# Patient Record
Sex: MALE | Race: WHITE | NOT HISPANIC OR LATINO | Employment: OTHER | URBAN - METROPOLITAN AREA
[De-identification: names, ages, dates, MRNs, and addresses within clinical notes are randomized per-mention and may not be internally consistent; named-entity substitution may affect disease eponyms.]

---

## 2021-06-15 ENCOUNTER — TELEPHONE (OUTPATIENT)
Dept: NEUROLOGY | Facility: CLINIC | Age: 76
End: 2021-06-15

## 2021-06-15 NOTE — TELEPHONE ENCOUNTER
Best contact number for patient:[phone]    Emergency Contact name and number:None    Referring provider and telephone number:Self     Primary Care Provider Name and if affiliated with [facility]  [place]: Dr Garret Torres [phone]    Reason for Appointment/Dx:Seizure     Have you seen and followed up with a pediatric Neurologist for this disease in the past?  No      Neurology Location patient would like to be seen:[place]     Order received? Self Ref                                                 Records Received? No    Have you ever seen another Neurologist?       No     Insurance Information    Insurance Name:[employer] [facility] [place]     ID/Policy #:NVY [id number]      Secondary Insurance:Medicare     ID/Policy#: [id number]      Workman's Comp/ Accident/ School  Information      Workman's Comp/Accident/School related?        None    If yes name of Insurance company:    Claim #:    Date of Injury:    Type of Injury:     Name and Telephone Number:    Notes:Appointment schedule with patient & wife new patient pack sent confirmed insurance   Patient new to [facility] will have pcp fax over records                    Appointment date: 09-02-21 1:30pm with Dr Laura Tadeo

## 2021-08-27 ENCOUNTER — TELEPHONE (OUTPATIENT)
Dept: NEUROLOGY | Facility: CLINIC | Age: 76
End: 2021-08-27

## 2021-11-18 ENCOUNTER — APPOINTMENT (OUTPATIENT)
Dept: RADIOLOGY | Facility: OTHER | Age: 76
End: 2021-11-18
Payer: COMMERCIAL

## 2021-11-18 ENCOUNTER — OFFICE VISIT (OUTPATIENT)
Dept: OBGYN CLINIC | Facility: OTHER | Age: 76
End: 2021-11-18
Payer: COMMERCIAL

## 2021-11-18 VITALS
SYSTOLIC BLOOD PRESSURE: 150 MMHG | HEIGHT: 71 IN | BODY MASS INDEX: 28.39 KG/M2 | WEIGHT: 202.8 LBS | DIASTOLIC BLOOD PRESSURE: 85 MMHG | HEART RATE: 63 BPM

## 2021-11-18 DIAGNOSIS — M75.81 TENDINITIS OF RIGHT ROTATOR CUFF: ICD-10-CM

## 2021-11-18 DIAGNOSIS — M25.511 RIGHT SHOULDER PAIN, UNSPECIFIED CHRONICITY: ICD-10-CM

## 2021-11-18 DIAGNOSIS — M25.511 ACUTE PAIN OF RIGHT SHOULDER: Primary | ICD-10-CM

## 2021-11-18 DIAGNOSIS — M75.51 SUBACROMIAL BURSITIS OF RIGHT SHOULDER JOINT: ICD-10-CM

## 2021-11-18 PROCEDURE — 73030 X-RAY EXAM OF SHOULDER: CPT

## 2021-11-18 PROCEDURE — 99203 OFFICE O/P NEW LOW 30 MIN: CPT | Performed by: ORTHOPAEDIC SURGERY

## 2021-11-18 PROCEDURE — 20610 DRAIN/INJ JOINT/BURSA W/O US: CPT | Performed by: ORTHOPAEDIC SURGERY

## 2021-11-18 RX ORDER — METHOCARBAMOL 750 MG/1
TABLET, FILM COATED ORAL
COMMUNITY
Start: 2021-11-12

## 2021-11-18 RX ORDER — METOPROLOL SUCCINATE 100 MG/1
TABLET, EXTENDED RELEASE ORAL
COMMUNITY
Start: 2021-08-28

## 2021-11-18 RX ORDER — ACETAMINOPHEN 500 MG
500 TABLET ORAL EVERY 6 HOURS PRN
COMMUNITY

## 2021-11-18 RX ORDER — ROSUVASTATIN CALCIUM 20 MG/1
TABLET, COATED ORAL
COMMUNITY
Start: 2021-11-12

## 2021-11-18 RX ORDER — ASPIRIN 81 MG/1
81 TABLET, CHEWABLE ORAL DAILY
COMMUNITY

## 2021-11-18 RX ORDER — BETAMETHASONE SODIUM PHOSPHATE AND BETAMETHASONE ACETATE 3; 3 MG/ML; MG/ML
6 INJECTION, SUSPENSION INTRA-ARTICULAR; INTRALESIONAL; INTRAMUSCULAR; SOFT TISSUE
Status: COMPLETED | OUTPATIENT
Start: 2021-11-18 | End: 2021-11-18

## 2021-11-18 RX ORDER — BUPIVACAINE HYDROCHLORIDE 2.5 MG/ML
2 INJECTION, SOLUTION INFILTRATION; PERINEURAL
Status: COMPLETED | OUTPATIENT
Start: 2021-11-18 | End: 2021-11-18

## 2021-11-18 RX ORDER — ASCORBIC ACID 500 MG
500 TABLET ORAL DAILY
COMMUNITY

## 2021-11-18 RX ADMIN — BETAMETHASONE SODIUM PHOSPHATE AND BETAMETHASONE ACETATE 6 MG: 3; 3 INJECTION, SUSPENSION INTRA-ARTICULAR; INTRALESIONAL; INTRAMUSCULAR; SOFT TISSUE at 15:10

## 2021-11-18 RX ADMIN — BUPIVACAINE HYDROCHLORIDE 2 ML: 2.5 INJECTION, SOLUTION INFILTRATION; PERINEURAL at 15:10

## 2021-11-30 ENCOUNTER — EVALUATION (OUTPATIENT)
Dept: PHYSICAL THERAPY | Facility: CLINIC | Age: 76
End: 2021-11-30
Payer: COMMERCIAL

## 2021-11-30 DIAGNOSIS — M75.51 SUBACROMIAL BURSITIS OF RIGHT SHOULDER JOINT: ICD-10-CM

## 2021-11-30 DIAGNOSIS — M75.81 TENDINITIS OF RIGHT ROTATOR CUFF: ICD-10-CM

## 2021-11-30 DIAGNOSIS — M25.511 ACUTE PAIN OF RIGHT SHOULDER: ICD-10-CM

## 2021-11-30 PROCEDURE — 97161 PT EVAL LOW COMPLEX 20 MIN: CPT

## 2021-12-07 ENCOUNTER — OFFICE VISIT (OUTPATIENT)
Dept: PHYSICAL THERAPY | Facility: CLINIC | Age: 76
End: 2021-12-07
Payer: COMMERCIAL

## 2021-12-07 DIAGNOSIS — M75.51 SUBACROMIAL BURSITIS OF RIGHT SHOULDER JOINT: ICD-10-CM

## 2021-12-07 DIAGNOSIS — M25.511 ACUTE PAIN OF RIGHT SHOULDER: Primary | ICD-10-CM

## 2021-12-07 DIAGNOSIS — M75.81 TENDINITIS OF RIGHT ROTATOR CUFF: ICD-10-CM

## 2021-12-07 PROCEDURE — 97110 THERAPEUTIC EXERCISES: CPT

## 2021-12-09 ENCOUNTER — APPOINTMENT (OUTPATIENT)
Dept: PHYSICAL THERAPY | Facility: CLINIC | Age: 76
End: 2021-12-09
Payer: COMMERCIAL

## 2021-12-14 ENCOUNTER — OFFICE VISIT (OUTPATIENT)
Dept: PHYSICAL THERAPY | Facility: CLINIC | Age: 76
End: 2021-12-14
Payer: COMMERCIAL

## 2021-12-14 DIAGNOSIS — M25.511 ACUTE PAIN OF RIGHT SHOULDER: Primary | ICD-10-CM

## 2021-12-14 DIAGNOSIS — M75.51 SUBACROMIAL BURSITIS OF RIGHT SHOULDER JOINT: ICD-10-CM

## 2021-12-14 DIAGNOSIS — M75.81 TENDINITIS OF RIGHT ROTATOR CUFF: ICD-10-CM

## 2021-12-14 PROCEDURE — 97110 THERAPEUTIC EXERCISES: CPT

## 2021-12-14 PROCEDURE — 97140 MANUAL THERAPY 1/> REGIONS: CPT

## 2021-12-16 ENCOUNTER — OFFICE VISIT (OUTPATIENT)
Dept: PHYSICAL THERAPY | Facility: CLINIC | Age: 76
End: 2021-12-16
Payer: COMMERCIAL

## 2021-12-16 DIAGNOSIS — M75.81 TENDINITIS OF RIGHT ROTATOR CUFF: ICD-10-CM

## 2021-12-16 DIAGNOSIS — M25.511 ACUTE PAIN OF RIGHT SHOULDER: Primary | ICD-10-CM

## 2021-12-16 DIAGNOSIS — M75.51 SUBACROMIAL BURSITIS OF RIGHT SHOULDER JOINT: ICD-10-CM

## 2021-12-16 PROCEDURE — 97110 THERAPEUTIC EXERCISES: CPT

## 2021-12-16 PROCEDURE — 97140 MANUAL THERAPY 1/> REGIONS: CPT

## 2021-12-21 ENCOUNTER — OFFICE VISIT (OUTPATIENT)
Dept: PHYSICAL THERAPY | Facility: CLINIC | Age: 76
End: 2021-12-21
Payer: COMMERCIAL

## 2021-12-21 DIAGNOSIS — M25.511 ACUTE PAIN OF RIGHT SHOULDER: Primary | ICD-10-CM

## 2021-12-21 DIAGNOSIS — M75.51 SUBACROMIAL BURSITIS OF RIGHT SHOULDER JOINT: ICD-10-CM

## 2021-12-21 DIAGNOSIS — M75.81 TENDINITIS OF RIGHT ROTATOR CUFF: ICD-10-CM

## 2021-12-21 PROCEDURE — 97140 MANUAL THERAPY 1/> REGIONS: CPT

## 2021-12-21 PROCEDURE — 97110 THERAPEUTIC EXERCISES: CPT

## 2021-12-23 ENCOUNTER — APPOINTMENT (OUTPATIENT)
Dept: PHYSICAL THERAPY | Facility: CLINIC | Age: 76
End: 2021-12-23
Payer: COMMERCIAL

## 2021-12-28 ENCOUNTER — APPOINTMENT (OUTPATIENT)
Dept: PHYSICAL THERAPY | Facility: CLINIC | Age: 76
End: 2021-12-28
Payer: COMMERCIAL

## 2021-12-30 ENCOUNTER — APPOINTMENT (OUTPATIENT)
Dept: RADIOLOGY | Facility: CLINIC | Age: 76
End: 2021-12-30
Payer: COMMERCIAL

## 2021-12-30 ENCOUNTER — APPOINTMENT (OUTPATIENT)
Dept: PHYSICAL THERAPY | Facility: CLINIC | Age: 76
End: 2021-12-30
Payer: COMMERCIAL

## 2021-12-30 DIAGNOSIS — M54.51 VERTEBROGENIC LOW BACK PAIN: ICD-10-CM

## 2021-12-30 PROCEDURE — 72110 X-RAY EXAM L-2 SPINE 4/>VWS: CPT

## 2022-01-04 ENCOUNTER — OFFICE VISIT (OUTPATIENT)
Dept: PHYSICAL THERAPY | Facility: CLINIC | Age: 77
End: 2022-01-04
Payer: COMMERCIAL

## 2022-01-04 DIAGNOSIS — M75.51 SUBACROMIAL BURSITIS OF RIGHT SHOULDER JOINT: ICD-10-CM

## 2022-01-04 DIAGNOSIS — M75.81 TENDINITIS OF RIGHT ROTATOR CUFF: ICD-10-CM

## 2022-01-04 DIAGNOSIS — M25.511 ACUTE PAIN OF RIGHT SHOULDER: Primary | ICD-10-CM

## 2022-01-04 PROCEDURE — 97140 MANUAL THERAPY 1/> REGIONS: CPT

## 2022-01-04 PROCEDURE — 97110 THERAPEUTIC EXERCISES: CPT

## 2022-01-04 NOTE — PROGRESS NOTES
Daily Note     Today's date: 2022  Patient name: Saurav Mancia  : 1945  MRN: [id number]  Referring provider: Jade Esquivel*  Dx:   Encounter Diagnosis     ICD-10-CM    1  Acute pain of right shoulder  M25 511    2  Tendinitis of right rotator cuff  M75 81    3  Subacromial bursitis of right shoulder joint  M75 51                   Subjective: Pt reports that he pulled a muscle in his back about 1 week ago therefore he states some exercises may aggravate his back and may want to modify or avoid today  Pt reports overall, improvement in R shoulder  Objective: See treatment diary below      Assessment: Pt with ability to perform all exercises today without exacerbating low back pain  Introduced body blade with minimal discomfort to further promote shoulder dynamic stability  Pt trialed throwing gently today with significant improvement noted with follow through motion  Pts subjective improvement is consistent with FOTO score improvement  Pt will benefit from continued skilled PT  Plan: Continue per plan of care  Precautions: None  Manuals        Laser R shoulder Subacromial 4'  subacromial 4' subacromial 4'  subacromial 4'  subacromial  4' subacromial 4'       STM    Prn DL delt, UT UT, delt  UT, delt MO UT, delt DL       Shoulder perturbations   Blocked pattern, IR/ER 5" hold isometrics Trial NV Blocked pattern, IR/ER 5" hold isometrics IR/ER 5" hold isometrics post cap  stretc (cross body) and post mob                       Neuro Re-Ed                                                                                                        Ther Ex             Shoulder Row Grn HEP demo Grn  3x10 Apollo 40# 3x10 Apollo 40# 3x10  Apollo  40#  3x10 Apollo 40# 3x10       Shoulder Ext Grn HEP demo Grn  3x10 Blue 3x10 Blue 3x10 Blue  3x10 Blue 3x10       Shoulder IR Grn HEP demo Grn  3x10 Double Red 3x10 Double Red 3x10 GTB  2x10 GTB 3x10 Shoulder ER Grn HEP demo Grn  3x10 Grn 3x10 Grn 3x10 Grn  2x15 Grn 3x10       Tricep extension      Black 3x10       Serratus Punch  15x 5# 2x10 5# 2x10 5#  2x10 8# 2x10       Horizontal abduction  RTB  2x10 RTB 2x10 NV RTB  2x10 RTB 2x10       S/l ER  15x S/l ER 2# 2x10 S/l ER 2# 2x10 S/l ER 2#  2x10 S/l ER 3#  2x10       Bent over Row   2x10 10# R 2x10 10# R 2x10  10# R NV       Prone Periscapular lift   Prone T, ext Trial NV NV 10x ea NV       Tband Robbery/W   Trialed (pain)          Body blade      IR/ER 3x20"       UBE Retro  5' 5' 6' 6' 6'                                 Ther Activity                                       Gait Training                                       Modalities

## 2022-01-06 ENCOUNTER — OFFICE VISIT (OUTPATIENT)
Dept: PHYSICAL THERAPY | Facility: CLINIC | Age: 77
End: 2022-01-06
Payer: COMMERCIAL

## 2022-01-06 DIAGNOSIS — M75.51 SUBACROMIAL BURSITIS OF RIGHT SHOULDER JOINT: ICD-10-CM

## 2022-01-06 DIAGNOSIS — M25.511 ACUTE PAIN OF RIGHT SHOULDER: Primary | ICD-10-CM

## 2022-01-06 DIAGNOSIS — M75.81 TENDINITIS OF RIGHT ROTATOR CUFF: ICD-10-CM

## 2022-01-06 PROCEDURE — 97110 THERAPEUTIC EXERCISES: CPT

## 2022-01-06 PROCEDURE — 97140 MANUAL THERAPY 1/> REGIONS: CPT

## 2022-01-06 NOTE — PROGRESS NOTES
Daily Note     Today's date: 2022  Patient name: Sandra Cosme  : 1945  MRN: [id number]  Referring provider: Michael Dias*  Dx:   Encounter Diagnosis     ICD-10-CM    1  Acute pain of right shoulder  M25 511    2  Tendinitis of right rotator cuff  M75 81    3  Subacromial bursitis of right shoulder joint  M75 51                   Subjective: Pt arrives with no complaints, back that he injured is slowly improving  Objective: See treatment diary below      Assessment: Trialed rebounder with no pain noted, initiated tband-robbery exercise with mild discomfort but able to perform with good technique  Pt continues to tolerate progressions in exercise  Pt continues with mild discomfort with exertion, pt will benefit from continued skilled PT  Plan: Continue per plan of care  Precautions: None  Manuals       Laser R shoulder Subacromial 4'  subacromial 4' subacromial 4'  subacromial 4'  subacromial  4' subacromial 4' subacromial 4'      STM    Prn DL delt, UT UT, delt  UT, delt MO UT, delt DL UT, delt DL      Shoulder perturbations   Blocked pattern, IR/ER 5" hold isometrics Trial NV Blocked pattern, IR/ER 5" hold isometrics IR/ER 5" hold isometrics post cap  stretc (cross body) and post mob  AP mob Gr II-III, post cap stretch   DL                   Neuro Re-Ed                                                                                                        Ther Ex             Shoulder Row Grn HEP demo Grn  3x10 Apollo 40# 3x10 Apollo 40# 3x10  Apollo  40#  3x10 Apollo 40# 3x10 Apollo 50# 3x10      Shoulder Ext Grn HEP demo Grn  3x10 Blue 3x10 Blue 3x10 Blue  3x10 Blue 3x10 Blue 3x10      Shoulder IR Grn HEP demo Grn  3x10 Double Red 3x10 Double Red 3x10 GTB  2x10 GTB 3x10 Double Red 3x10      Shoulder ER Grn HEP demo Grn  3x10 Grn 3x10 Grn 3x10 Grn  2x15 Grn 3x10 Blue 3x10      Tricep extension      Black 3x10 Black 3x10 Serratus Punch  15x 5# 2x10 5# 2x10 5#  2x10 8# 2x10 8# 2x10      Horizontal abduction  RTB  2x10 RTB 2x10 NV RTB  2x10 RTB 2x10 RTB 2x10      S/l ER  15x S/l ER 2# 2x10 S/l ER 2# 2x10 S/l ER 2#  2x10 S/l ER 3#  2x10 S/l ER 3#  2x10      Bent over Row   2x10 10# R 2x10 10# R 2x10  10# R NV NV      Prone Periscapular lift   Prone T, ext Trial NV NV 10x ea NV NV      Tband Robbery/W   Trialed (pain)    Red 2x10      Body blade      IR/ER 3x20" IR/ER 3x20"      UBE Retro  5' 5' 6' 6' 6' 3'/3' fwd/back      Rebounder       15x 2#                   Ther Activity                                       Gait Training                                       Modalities

## 2022-01-11 ENCOUNTER — OFFICE VISIT (OUTPATIENT)
Dept: PHYSICAL THERAPY | Facility: CLINIC | Age: 77
End: 2022-01-11
Payer: COMMERCIAL

## 2022-01-11 DIAGNOSIS — M75.81 TENDINITIS OF RIGHT ROTATOR CUFF: ICD-10-CM

## 2022-01-11 DIAGNOSIS — M25.511 ACUTE PAIN OF RIGHT SHOULDER: Primary | ICD-10-CM

## 2022-01-11 DIAGNOSIS — M75.51 SUBACROMIAL BURSITIS OF RIGHT SHOULDER JOINT: ICD-10-CM

## 2022-01-11 PROCEDURE — 97110 THERAPEUTIC EXERCISES: CPT

## 2022-01-11 NOTE — PROGRESS NOTES
Daily Note     Today's date: 2022  Patient name: Rosa Rodriguez  : 1945  MRN: [id number]  Referring provider: Casie Morin*  Dx:   Encounter Diagnosis     ICD-10-CM    1  Acute pain of right shoulder  M25 511    2  Tendinitis of right rotator cuff  M75 81    3  Subacromial bursitis of right shoulder joint  M75 51                   Subjective: Pt reports his shoulder has improved but does not feel great  Objective: See treatment diary below      Assessment: Pt reports discomfort with robbers and throwing rebounder  Plan: Continue per plan of care  Precautions: None  Manuals      Laser R shoulder Subacromial 4'  subacromial 4' subacromial 4'  subacromial 4'  subacromial  4' subacromial 4' subacromial 4' 4' HA     STM    Prn DL delt, UT UT, delt  UT, delt MO UT, delt DL UT, delt DL      Shoulder perturbations   Blocked pattern, IR/ER 5" hold isometrics Trial NV Blocked pattern, IR/ER 5" hold isometrics IR/ER 5" hold isometrics post cap  stretc (cross body) and post mob  AP mob Gr II-III, post cap stretch   DL DL                  Neuro Re-Ed                                                                                                        Ther Ex             Shoulder Row Grn HEP demo Grn  3x10 Apollo 40# 3x10 Apollo 40# 3x10  Apollo  40#  3x10 Apollo 40# 3x10 Apollo 50# 3x10 apollo 50# 3x10     Shoulder Ext Grn HEP demo Grn  3x10 Blue 3x10 Blue 3x10 Blue  3x10 Blue 3x10 Blue 3x10 Blue 3x10     Shoulder IR Grn HEP demo Grn  3x10 Double Red 3x10 Double Red 3x10 GTB  2x10 GTB 3x10 Double Red 3x10 double red 3x10     Shoulder ER Grn HEP demo Grn  3x10 Grn 3x10 Grn 3x10 Grn  2x15 Grn 3x10 Blue 3x10 Blue 3x10     Tricep extension      Black 3x10 Black 3x10 Black 3x10     Serratus Punch  15x 5# 2x10 5# 2x10 5#  2x10 8# 2x10 8# 2x10 8# 2x10     Horizontal abduction  RTB  2x10 RTB 2x10 NV RTB  2x10 RTB 2x10 RTB 2x10 GTB 2x10     S/l ER 15x S/l ER 2# 2x10 S/l ER 2# 2x10 S/l ER 2#  2x10 S/l ER 3#  2x10 S/l ER 3#  2x10 S/l ER 3# 2x10     Bent over Row   2x10 10# R 2x10 10# R 2x10  10# R NV NV      Prone Periscapular lift   Prone T, ext Trial NV NV 10x ea NV NV      Tband Robbery/W   Trialed (pain)    Red 2x10 Red 2x10     Body blade      IR/ER 3x20" IR/ER 3x20" IR/ER 3x20"     UBE Retro  5' 5' 6' 6' 6' 3'/3' fwd/back 3'/3'     Rebounder       15x 2# 15x 2#                  Ther Activity                                       Gait Training                                       Modalities

## 2022-01-13 ENCOUNTER — OFFICE VISIT (OUTPATIENT)
Dept: OBGYN CLINIC | Facility: OTHER | Age: 77
End: 2022-01-13
Payer: COMMERCIAL

## 2022-01-13 VITALS
HEIGHT: 71 IN | SYSTOLIC BLOOD PRESSURE: 174 MMHG | WEIGHT: 202.8 LBS | HEART RATE: 63 BPM | DIASTOLIC BLOOD PRESSURE: 91 MMHG | BODY MASS INDEX: 28.39 KG/M2

## 2022-01-13 DIAGNOSIS — M75.81 TENDINITIS OF RIGHT ROTATOR CUFF: Primary | ICD-10-CM

## 2022-01-13 DIAGNOSIS — M75.51 SUBACROMIAL BURSITIS OF RIGHT SHOULDER JOINT: ICD-10-CM

## 2022-01-13 PROCEDURE — 99213 OFFICE O/P EST LOW 20 MIN: CPT | Performed by: ORTHOPAEDIC SURGERY

## 2022-01-13 NOTE — PROGRESS NOTES
Assessment  Diagnoses and all orders for this visit:    Tendinitis of right rotator cuff    Subacromial bursitis of right shoulder joint      Discussion and Plan:    · Patient is progressing nicely on exam today  · He may continue activities as tolerated  · Call the office is symptoms worsen    Subjective:   Patient ID: Yaquelin Ta is a 77 y o  male      HPI   Patient presents today for follow up of right shoulder impingement syndrome  He was provided with a CS injection and a referral to physical therapy  Patient reports overall improvement in his symptoms  He reports night-time pain if he tries to sleep on the right side or if he over uses the shoulder  The following portions of the patient's history were reviewed and updated as appropriate: allergies, current medications, past family history, past medical history, past social history, past surgical history and problem list     Review of Systems   Constitutional: Negative for chills and fever  HENT: Negative for drooling and hearing loss  Eyes: Negative for visual disturbance  Respiratory: Negative for cough and shortness of breath  Cardiovascular: Negative for chest pain  Gastrointestinal: Negative for abdominal pain  Skin: Negative for rash  Psychiatric/Behavioral: Negative for agitation  Objective:  BP (!) 174/91   Pulse 63   Ht 5' 11" (1 803 m)   Wt 92 kg (202 lb 12 8 oz)   BMI 28 28 kg/m²       Right Shoulder Exam     Tenderness   The patient is experiencing no tenderness  Range of Motion   The patient has normal right shoulder ROM  Muscle Strength   Abduction: 5/5   Internal rotation: 5/5   External rotation: 5/5     Tests   Lopez test: negative  Impingement: negative    Other   Erythema: absent  Sensation: normal  Pulse: present            Physical Exam  Vitals reviewed  Constitutional:       Appearance: He is well-developed  HENT:      Head: Normocephalic     Eyes:      Pupils: Pupils are equal, round, and reactive to light  Pulmonary:      Effort: Pulmonary effort is normal    Skin:     General: Skin is warm and dry  I have personally reviewed pertinent films in PACS and my interpretation is as follows    Right shoulder x-rays demonstrates no fracture or dislocation        Scribe Attestation    I,:  Susan Paulson am acting as a scribe while in the presence of the attending physician :       I,:  Trisha Meneses MD personally performed the services described in this documentation    as scribed in my presence :

## 2022-09-20 ENCOUNTER — HOSPITAL ENCOUNTER (EMERGENCY)
Facility: HOSPITAL | Age: 77
Discharge: HOME | End: 2022-09-20
Attending: EMERGENCY MEDICINE
Payer: COMMERCIAL

## 2022-09-20 ENCOUNTER — APPOINTMENT (OUTPATIENT)
Dept: RADIOLOGY | Facility: HOSPITAL | Age: 77
End: 2022-09-20
Payer: COMMERCIAL

## 2022-09-20 VITALS
DIASTOLIC BLOOD PRESSURE: 74 MMHG | RESPIRATION RATE: 18 BRPM | OXYGEN SATURATION: 99 % | SYSTOLIC BLOOD PRESSURE: 167 MMHG | TEMPERATURE: 98.2 F | HEART RATE: 67 BPM

## 2022-09-20 DIAGNOSIS — S62.639B: ICD-10-CM

## 2022-09-20 DIAGNOSIS — S61.319A LACERATION OF NAIL BED OF FINGER, INITIAL ENCOUNTER: ICD-10-CM

## 2022-09-20 DIAGNOSIS — S61.311A LACERATION OF LEFT INDEX FINGER WITHOUT FOREIGN BODY WITH DAMAGE TO NAIL, INITIAL ENCOUNTER: Primary | ICD-10-CM

## 2022-09-20 PROCEDURE — 90715 TDAP VACCINE 7 YRS/> IM: CPT | Performed by: PHYSICIAN ASSISTANT

## 2022-09-20 PROCEDURE — 96365 THER/PROPH/DIAG IV INF INIT: CPT

## 2022-09-20 PROCEDURE — 12001 RPR S/N/AX/GEN/TRNK 2.5CM/<: CPT | Performed by: PHYSICIAN ASSISTANT

## 2022-09-20 PROCEDURE — 73140 X-RAY EXAM OF FINGER(S): CPT

## 2022-09-20 PROCEDURE — 99284 EMERGENCY DEPT VISIT MOD MDM: CPT | Performed by: PHYSICIAN ASSISTANT

## 2022-09-20 PROCEDURE — 99283 EMERGENCY DEPT VISIT LOW MDM: CPT

## 2022-09-20 PROCEDURE — 90471 IMMUNIZATION ADMIN: CPT

## 2022-09-20 PROCEDURE — 96366 THER/PROPH/DIAG IV INF ADDON: CPT

## 2022-09-20 RX ORDER — CEPHALEXIN 500 MG/1
500 CAPSULE ORAL EVERY 8 HOURS SCHEDULED
Qty: 30 CAPSULE | Refills: 0 | Status: SHIPPED | OUTPATIENT
Start: 2022-09-20 | End: 2022-09-30

## 2022-09-20 RX ORDER — LIDOCAINE HYDROCHLORIDE 10 MG/ML
10 INJECTION, SOLUTION EPIDURAL; INFILTRATION; INTRACAUDAL; PERINEURAL ONCE
Status: COMPLETED | OUTPATIENT
Start: 2022-09-20 | End: 2022-09-20

## 2022-09-20 RX ORDER — CEFAZOLIN SODIUM 2 G/50ML
2000 SOLUTION INTRAVENOUS ONCE
Status: COMPLETED | OUTPATIENT
Start: 2022-09-20 | End: 2022-09-20

## 2022-09-20 RX ADMIN — CEFAZOLIN SODIUM 2000 MG: 2 SOLUTION INTRAVENOUS at 18:43

## 2022-09-20 RX ADMIN — LIDOCAINE HYDROCHLORIDE 10 ML: 10 INJECTION, SOLUTION EPIDURAL; INFILTRATION; INTRACAUDAL; PERINEURAL at 18:32

## 2022-09-20 RX ADMIN — TETANUS TOXOID, REDUCED DIPHTHERIA TOXOID AND ACELLULAR PERTUSSIS VACCINE, ADSORBED 0.5 ML: 5; 2.5; 8; 8; 2.5 SUSPENSION INTRAMUSCULAR at 18:32

## 2022-09-20 NOTE — ED PROVIDER NOTES
History  Chief Complaint   Patient presents with    Finger Laceration     Patient reports to ED after cutting left index finger with chop saw about two hours ago  Patient reports heavy bleeding at time of injury, but has gotten it under control  Patient reports the nail is severed  52-year-old male past medical history HTN, hyperlipidemia, coronary artery disease status post remote CABG, history of aortic valve replacement presents to emergency department for left index finger injury just prior to arrival   Patient states he was utilizing a chop saw and a piece of wood flipped back on his hand injuring his left hand 2nd and 3rd digits with nail plate injury and bleeding from his left index finger  Prior to Admission Medications   Prescriptions Last Dose Informant Patient Reported? Taking? BIOTIN 5000 PO   Yes No   Sig: Take by mouth   Cholecalciferol 125 MCG (5000 UT) capsule   Yes No   Sig: Take 5,000 Units by mouth daily   acetaminophen (TYLENOL) 500 mg tablet   Yes No   Sig: Take 500 mg by mouth every 6 (six) hours as needed   ascorbic acid (VITAMIN C) 500 MG tablet   Yes No   Sig: Take 500 mg by mouth daily   aspirin 81 mg chewable tablet   Yes No   Sig: Chew 81 mg daily   dronedarone (MULTAQ) 400 mg tablet   Yes No   Sig: Take 400 mg by mouth   methocarbamol (ROBAXIN) 750 mg tablet   Yes No   metoprolol succinate (TOPROL-XL) 100 mg 24 hr tablet   Yes No   rosuvastatin (CRESTOR) 20 MG tablet   Yes No      Facility-Administered Medications: None       History reviewed  No pertinent past medical history  Past Surgical History:   Procedure Laterality Date    AORTIC VALVE SURGERY N/A     KNEE SURGERY      right knee partial replacement       History reviewed  No pertinent family history  I have reviewed and agree with the history as documented      E-Cigarette/Vaping    E-Cigarette Use Never User      E-Cigarette/Vaping Substances     Social History     Tobacco Use    Smoking status: Never Smoker    Smokeless tobacco: Never Used   Vaping Use    Vaping Use: Never used   Substance Use Topics    Alcohol use: Never    Drug use: Never       Review of Systems   Constitutional: Negative for chills and fever  HENT: Negative for ear pain and sore throat  Eyes: Negative for pain and visual disturbance  Respiratory: Negative for cough and shortness of breath  Cardiovascular: Negative for chest pain and palpitations  Gastrointestinal: Negative for abdominal pain and vomiting  Genitourinary: Negative for dysuria and hematuria  Musculoskeletal: Negative for arthralgias and back pain  Left index finger injury with nail bed laceration and nail plate fracture   Skin: Negative for color change and rash  Neurological: Negative for seizures and syncope  All other systems reviewed and are negative  Physical Exam  Physical Exam  Vitals and nursing note reviewed  Constitutional:       Appearance: He is well-developed  HENT:      Head: Normocephalic and atraumatic  Eyes:      Conjunctiva/sclera: Conjunctivae normal    Cardiovascular:      Rate and Rhythm: Normal rate and regular rhythm  Heart sounds: No murmur heard  Pulmonary:      Effort: Pulmonary effort is normal  No respiratory distress  Breath sounds: Normal breath sounds  Abdominal:      Palpations: Abdomen is soft  Tenderness: There is no abdominal tenderness  Musculoskeletal:      Cervical back: Neck supple  Comments: Left index finger nail plate fracture and nailbed laceration with bleeding   Skin:     General: Skin is warm and dry  Neurological:      Mental Status: He is alert           Vital Signs  ED Triage Vitals [09/20/22 1813]   Temperature Pulse Respirations Blood Pressure SpO2   98 2 °F (36 8 °C) 67 18 167/74 99 %      Temp Source Heart Rate Source Patient Position - Orthostatic VS BP Location FiO2 (%)   Temporal Monitor Sitting Left arm --      Pain Score       --           Vitals: 09/20/22 1813   BP: 167/74   Pulse: 67   Patient Position - Orthostatic VS: Sitting         Visual Acuity      ED Medications  Medications   tetanus-diphtheria-acellular pertussis (BOOSTRIX) IM injection 0 5 mL (0 5 mL Intramuscular Given 9/20/22 1832)   lidocaine (PF) (XYLOCAINE-MPF) 1 % injection 10 mL (10 mL Infiltration Given 9/20/22 1832)   ceFAZolin (ANCEF) IVPB (premix in dextrose) 2,000 mg 50 mL (0 mg Intravenous Stopped 9/20/22 2040)       Diagnostic Studies  Results Reviewed     None                 XR finger second digit-index LEFT   ED Interpretation by Mone Guerra PA-C (09/20 1833)   Linear fracture of left 2nd digit distal phalanx                 Procedures  Laceration repair    Date/Time: 9/20/2022 8:00 PM  Performed by: Mone Guerra PA-C  Authorized by: Mone Guerra PA-C   Consent: Verbal consent obtained  Consent given by: patient  Patient understanding: patient states understanding of the procedure being performed  Patient consent: the patient's understanding of the procedure matches consent given  Patient identity confirmed: verbally with patient and hospital-assigned identification number  Body area: upper extremity  Location details: left index finger  Laceration length: 1 cm  Foreign bodies: no foreign bodies  Tendon involvement: none  Nerve involvement: none  Vascular damage: no  Anesthesia: digital block    Anesthesia:  Local Anesthetic: lidocaine 1% without epinephrine  Anesthetic total: 4 mL    Sedation:  Patient sedated: no        Procedure Details:  Irrigation solution: saline  Irrigation method: syringe  Amount of cleaning: standard  Debridement: none  Degree of undermining: none  Skin closure: 4-0 nylon  Number of sutures: 5  Technique: simple  Approximation: close  Dressing: 4x4 sterile gauze               ED Course  ED Course as of 09/20/22 2321   Tue Sep 20, 2022   [address] [place] [address] Patient with open fracture of left index finger  Ancef 2 ordered    Tetanus ordered  MDM  Number of Diagnoses or Management Options  Fracture of finger, distal phalanx, open: new and requires workup  Laceration of left index finger without foreign body with damage to nail, initial encounter: new and requires workup  Laceration of nail bed of finger, initial encounter: new and requires workup     Amount and/or Complexity of Data Reviewed  Tests in the radiology section of CPT®: ordered and reviewed  Independent visualization of images, tracings, or specimens: yes (Left 2nd distal phalanx fracture interpreted by myself)        Disposition  Final diagnoses:   Laceration of left index finger without foreign body with damage to nail, initial encounter   Fracture of finger, distal phalanx, open   Laceration of nail bed of finger, initial encounter     Time reflects when diagnosis was documented in both MDM as applicable and the Disposition within this note     Time User Action Codes Description Comment    9/20/2022  8:18 PM Anila Shyanne Add [E12 255R] Laceration of left index finger without foreign body with damage to nail, initial encounter     9/20/2022  8:19 PM Axson Shyanne Add [S62 639B] Fracture of finger, distal phalanx, open     9/20/2022  8:24 PM Navarro Mcclellan Laceration of nail bed of finger, initial encounter       ED Disposition     ED Disposition   Discharge    Condition   Stable    Date/Time   Tue Sep 20, 2022  8:17 PM    Devante Gaines discharge to home/self care                 Follow-up Information     Follow up With Specialties Details Why Contact Info Additional Information    Hilda Red MD Orthopedic Surgery, Hand Surgery Call today Schedule appointment for follow-up for open fracture of left distal 2nd phalanx [address]   [facility] [facility] [id number] [phone]        [address] Emergency Department Emergency Medicine Go to  If symptoms worsen or if development of very signs symptoms of infection  [address] [place],[address]D [address]  [address] [facility], [address] [place], [facility], [address]          Discharge Medication List as of 9/20/2022  8:26 PM      START taking these medications    Details   cephalexin (KEFLEX) 500 mg capsule Take 1 capsule (500 mg total) by mouth every 8 (eight) hours for 10 days, Starting Tue 9/20/2022, Until Fri 9/30/2022, Normal         CONTINUE these medications which have NOT CHANGED    Details   acetaminophen (TYLENOL) 500 mg tablet Take 500 mg by mouth every 6 (six) hours as needed, Historical Med      ascorbic acid (VITAMIN C) 500 MG tablet Take 500 mg by mouth daily, Historical Med      aspirin 81 mg chewable tablet Chew 81 mg daily, Historical Med      BIOTIN 5000 PO Take by mouth, Historical Med      Cholecalciferol 125 MCG (5000 UT) capsule Take 5,000 Units by mouth daily, Historical Med      dronedarone (MULTAQ) 400 mg tablet Take 400 mg by mouth, Historical Med      methocarbamol (ROBAXIN) 750 mg tablet Starting Fri 11/12/2021, Historical Med      metoprolol succinate (TOPROL-XL) 100 mg 24 hr tablet Starting Sat 8/28/2021, Historical Med      rosuvastatin (CRESTOR) 20 MG tablet Starting Fri 11/12/2021, Historical Med             No discharge procedures on file      PDMP Review     None          ED Provider  Electronically Signed by           Edith Davis PA-C  09/20/22 5393

## 2022-09-22 ENCOUNTER — OFFICE VISIT (OUTPATIENT)
Dept: OBGYN CLINIC | Facility: CLINIC | Age: 77
End: 2022-09-22
Payer: COMMERCIAL

## 2022-09-22 VITALS
BODY MASS INDEX: 28.28 KG/M2 | WEIGHT: 202 LBS | HEIGHT: 71 IN | DIASTOLIC BLOOD PRESSURE: 79 MMHG | SYSTOLIC BLOOD PRESSURE: 161 MMHG | HEART RATE: 64 BPM

## 2022-09-22 DIAGNOSIS — S62.661B OPEN NONDISPLACED FRACTURE OF DISTAL PHALANX OF LEFT INDEX FINGER, INITIAL ENCOUNTER: ICD-10-CM

## 2022-09-22 DIAGNOSIS — S61.319A LACERATION OF NAIL BED OF FINGER, INITIAL ENCOUNTER: Primary | ICD-10-CM

## 2022-09-22 PROCEDURE — 26750 TREAT FINGER FRACTURE EACH: CPT | Performed by: SURGERY

## 2022-09-22 PROCEDURE — 99213 OFFICE O/P EST LOW 20 MIN: CPT | Performed by: SURGERY

## 2022-09-22 RX ORDER — LOSARTAN POTASSIUM 25 MG/1
25 TABLET ORAL DAILY
COMMUNITY
Start: 2022-04-07 | End: 2023-04-07

## 2022-09-22 NOTE — PROGRESS NOTES
Assessment/Plan:    Left index finger nail bed laceration with open distal phalanx fracture  -Nail bed in place  -Splint removed and changed to bulky dressing with xeroform and gauze followed by a coban over wrap  -He may remove dressing and wash finger whenever comfortable in the next few days  -May wean dressing to band aids a tolerated  -Finger ROM as tolerated  -F/u 7-10 days for suture removal     Diagnoses and all orders for this visit:    Laceration of nail bed of finger, initial encounter  -     Fracture / Dislocation Treatment    Open nondisplaced fracture of distal phalanx of left index finger, initial encounter  -     Fracture / Dislocation Treatment    Other orders  -     losartan (COZAAR) 25 mg tablet; Take 25 mg by mouth daily          Subjective:      Patient ID: Saurav Mancia is a 77 y o  male  HPI    Pt is a 78 yo male who sustained a left index finger open distal phalanx fracture and nail bed laceration on 9/20/22  He was using a saw, and a piece of wood kicked back and struck his finger  He presented to the ER for evaluation  He notes some mild aching pain  Denies fever/chills  He is presently in an alumifoam splint        Review of Systems - General ROS: negative  Ophthalmic ROS: negative  ENT ROS: negative  Respiratory ROS: no cough, shortness of breath, or wheezing  Cardiovascular ROS: no chest pain or dyspnea on exertion  Gastrointestinal ROS: no abdominal pain, change in bowel habits, or black or bloody stools  Dermatological ROS: negative      Objective:      /79   Pulse 64   Ht 5' 11" (1 803 m)   Wt 91 6 kg (202 lb)   BMI 28 17 kg/m²             PE:  General:  NAD  HEENT:  EOMI, perrla, normal ear morphology  Chest:  Unlabored breathing  Heart:  Regular pulse  Abd:  No distension  Extrem: LUE:  Index finger:  Dorsal finger sutures around nail fold, nail plate in place, no erythema, no induration, finger flexion slightly reduced, tender on palpation of finger tip  Skin: Dry, pink  Neuro:  AAOx3     Fracture / Dislocation Treatment    Date/Time: 9/22/2022 3:13 PM  Performed by: Yola Villeda MD  Authorized by: Yola Villeda MD     Patient Location:  [facility] Protocol:  Consent: Verbal consent not obtained  Written consent not obtained    Risks and benefits: risks, benefits and alternatives were discussed  Consent given by: patient  Patient identity confirmed: verbally with patient      Injury location:  Finger  Location details:  Left index finger  Injury type:  Fracture  Fracture type: distal phalanx    MCP joint involved?: No    Any IP joint involved?: No    Distal perfusion: normal    Neurological function: normal    Range of motion: reduced    Local anesthesia used?: No    Manipulation performed?: No    Immobilization:  Other (comment) (bulky dressing with coban overwrap)  Distal perfusion: normal    Neurological function: normal    Range of motion: normal

## 2022-09-29 ENCOUNTER — OFFICE VISIT (OUTPATIENT)
Dept: OBGYN CLINIC | Facility: CLINIC | Age: 77
End: 2022-09-29

## 2022-09-29 VITALS
SYSTOLIC BLOOD PRESSURE: 139 MMHG | DIASTOLIC BLOOD PRESSURE: 79 MMHG | HEART RATE: 55 BPM | WEIGHT: 202 LBS | BODY MASS INDEX: 28.28 KG/M2 | HEIGHT: 71 IN

## 2022-09-29 DIAGNOSIS — S61.319A LACERATION OF NAIL BED OF FINGER, INITIAL ENCOUNTER: Primary | ICD-10-CM

## 2022-09-29 PROCEDURE — 99024 POSTOP FOLLOW-UP VISIT: CPT | Performed by: SURGERY

## 2022-09-29 NOTE — PROGRESS NOTES
Assessment:    Left open distal phalanx fracture with nail bed laceration       Plan:    Sutures removed today and band-aid placed  OK to clean wound with soapy water, pat dry  No dressing necessary, can leave open to air   ROM as tolerated  Activities to tolerance  Follow-up PRN        Subjective:     HPI    Patient ID:  Shankar Orellana is a 77 y o  male presenting for follow-up left index finger nail bed laceration  Since last week he is doing well with no pain in the finger  Has not changed dressing  No fever/chills  No complaints offered  The following portions of the patient's history were reviewed and updated as appropriate: allergies, current medications, past family history, past medical history, past social history, past surgical history, and problem list     Review of Systems     Objective:    Imaging:  None for today's visit      Physical Exam     Vitals:    09/29/22 1331   BP: 139/79   Pulse: 55       Orthopedic Examination:  Left index finger     Dorsal finger sutures at proximal nail fold with nail plate in place  No erythema or ecchymosis  Appropriate swelling  No fusiform swelling  Minimal TTP fingertip  Good flexion/extension  Sensation intact  Good cap refill at fingertip      Suture removal    Date/Time: 9/29/2022 1:47 PM  Performed by: Uriah Nixon PA-C  Authorized by: Uriah Nixon PA-C   Universal Protocol:  Consent: Verbal consent obtained  Risks and benefits: risks, benefits and alternatives were discussed  Consent given by: patient  Patient identity confirmed: verbally with patient        Patient location:  Clinic  Location:     Laterality:  Left    Location:  Upper extremity    Upper extremity location:  Hand    Hand location:  L index finger  Procedure details:      Tools used:  Suture removal kit and scissors    Wound appearance:  No sign(s) of infection, good wound healing and clean  Post-procedure details:     Post-removal:  Dressing applied    Patient tolerance of procedure:   Tolerated well, no immediate complications

## 2023-03-13 ENCOUNTER — TELEPHONE (OUTPATIENT)
Dept: OBGYN CLINIC | Facility: CLINIC | Age: 78
End: 2023-03-13

## 2023-03-13 NOTE — TELEPHONE ENCOUNTER
Caller: Patient     Doctor: Analia Khalil     Reason for call: Patient states he was driving home from work and noticed that the from the knuckle down his finger was numb and white  He states he did heat it up and went back to normal      He does have a history of raynaud's but usually the whole hand is affecting by this       Wanting to know if this is normal?     Call back#: [phone]

## 2023-03-14 NOTE — TELEPHONE ENCOUNTER
Called patient regarding message  His finger has been normal since that incident  Discussed that it is something to monitor  Raynaud's can progress over time  He notes only some intermittent episodes with significant cold exposure thus far, and usually all digits are affected at the same time  He will monitor